# Patient Record
(demographics unavailable — no encounter records)

---

## 2025-02-14 NOTE — HISTORY OF PRESENT ILLNESS
[TextBox_4] : 61 yo M with severe asthma, nasal polyps, HTN, history of 30 pound weight loss, eosinophilia, elevated CEA, and CKD and nephrosclerosis. Former smoker x 22 years (quit in 2005). He was admitted to [facility] via [facility] on 04/15/22 with an "asthma exacerbation" requiring mechanical ventilatory support. He was extubated the following day and treated with prednisone.  He has been undergoing a work up for weight loss and elevated CEA since 2022. Also being evaluated for for eosinophilia - was initially thought to be Churg Deng vs eosinophilic asthma vs. ABPA. 07/31/23, he had a CT chest which revealed new complete atelectasis RML and mild-to-moderate small and large airways inflammation. Underwent bronchoscopy to r/o MARCELINO infection 9/2023 - cultures neg, aspergillosis ag from serum was very mildly elevated, but IgE WNL. Asthma profile WNL. Ultimately underwent both skin and renal biopsy - neither of which demonstrated vasculitis. CKD appears to be 2/2 severe vascular sclerosis for which the focus is on BP control.  12/04/23, he had an echocardiogram for complaint of BANKS which revealed an EF of 63%, moderate TR, and PASP 48 mmHg, referred back to PH/Pulm clinic. At initial visit c/o frequent wheezing and phlegm, at first improved on ICS but then plateaued and symptoms again worsened. Used to have witnessed apneas, resolved after 30lb weight loss. Ultimatel started on Nucala 1/25/24 for significant eosinophilia without IgE, sinus congestion, asthma. At last visit he had received 1 dose and already felt significant improvement in symptoms. Has seen ENT and A/I since last visit, symptoms appear well controlled.   2-14-25 He feels very good. Has no issues with breathing. Nucala has been life changing for him. Still notes post nasal drip and sinus issues that he is following with his ENT. Works as a  and on his feet and lots of physically strenuous work, but no limitations since starting nucala a year ago.

## 2025-02-14 NOTE — ASSESSMENT
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 He is now doing well on Nucala. His CT chest 2/12/24 showed significant improvement in previously demonstrated infectious/inflammatory bronchiolitis and mucous plugging with much less air trapping and resolved atelectasis of RML. His PFTs 2/2025 with obstruction  and mildly reduced DLCO. Nasal congestion and polyps improved w/ steroids, also confirmed by CT sinuses 4/2024 (chronic opacification of frontal-ethmoid-maxillary sinuses unchanged). He has follow up with ENT and allergy. Discussed he should avoid ALL NSAIDS/aspirin, likely has AERD. - Continue Nucala - Repeat PFTs as above, reduced FEV1 and ratio however clinically doing very well. Will continue to monitor symptoms - Repeat PFTs in 1 year  #PH - mild PH on TTE, known HTN, no secondary findings of PAH (normal RV size/function, normal RA size) although TR has increased. Will focus on treatment of airway disease as above and re-evaluate TTE. Symptoms more consistent with airway disease rather than PH. Repeat yearly TTE  w/ cardiology  Plan: -continue Nucala -can use Wixela as needed, new prescription sent -continue nasal spray, nasal irrigation per ENT -continue follow up with ENT/allergy  -will repeat CBC today w diff -avoid NSAIDS  RTC 6 months

## 2025-02-14 NOTE — ASSESSMENT
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 He is now doing well on Nucala. His CT chest 2/12/24 showed significant improvement in previously demonstrated infectious/inflammatory bronchiolitis and mucous plugging with much less air trapping and resolved atelectasis of RML. His PFTs 2/2025 with obstruction  and mildly reduced DLCO. Nasal congestion and polyps improved w/ steroids, also confirmed by CT sinuses 4/2024 (chronic opacification of frontal-ethmoid-maxillary sinuses unchanged). He has follow up with ENT and allergy. Discussed he should avoid ALL NSAIDS/aspirin, likely has AERD. - Continue Nucala - Repeat PFTs as above, reduced FEV1 and ratio however clinically doing very well. Will continue to monitor symptoms - Repeat PFTs in 1 year  #PH - mild PH on TTE, known HTN, no secondary findings of PAH (normal RV size/function, normal RA size) although TR has increased. Will focus on treatment of airway disease as above and re-evaluate TTE. Symptoms more consistent with airway disease rather than PH. Repeat yearly TTE  w/ cardiology  Plan: -continue Nucala -can use Wixela as needed, new prescription sent -continue nasal spray, nasal irrigation per ENT -continue follow up with ENT/allergy  -will repeat CBC today w diff -avoid NSAIDS  RTC 6 months

## 2025-02-14 NOTE — HISTORY OF PRESENT ILLNESS
[TextBox_4] : 61 yo M with severe asthma, nasal polyps, HTN, history of 30 pound weight loss, eosinophilia, elevated CEA, and CKD and nephrosclerosis. Former smoker x 22 years (quit in 2005). He was admitted to [facility] on 04/15/22 with an "asthma exacerbation" requiring mechanical ventilatory support. He was extubated the following day and treated with prednisone.  He has been undergoing a work up for weight loss and elevated CEA since 2022. Also being evaluated for for eosinophilia - was initially thought to be Churg Deng vs eosinophilic asthma vs. ABPA. 07/31/23, he had a CT chest which revealed new complete atelectasis RML and mild-to-moderate small and large airways inflammation. Underwent bronchoscopy to r/o MARCELINO infection 9/2023 - cultures neg, aspergillosis ag from serum was very mildly elevated, but IgE WNL. Asthma profile WNL. Ultimately underwent both skin and renal biopsy - neither of which demonstrated vasculitis. CKD appears to be 2/2 severe vascular sclerosis for which the focus is on BP control.  12/04/23, he had an echocardiogram for complaint of BANKS which revealed an EF of 63%, moderate TR, and PASP 48 mmHg, referred back to PH/Pulm clinic. At initial visit c/o frequent wheezing and phlegm, at first improved on ICS but then plateaued and symptoms again worsened. Used to have witnessed apneas, resolved after 30lb weight loss. Ultimatel started on Nucala 1/25/24 for significant eosinophilia without IgE, sinus congestion, asthma. At last visit he had received 1 dose and already felt significant improvement in symptoms. Has seen ENT and A/I since last visit, symptoms appear well controlled.   2-14-25 He feels very good. Has no issues with breathing. Nucala has been life changing for him. Still notes post nasal drip and sinus issues that he is following with his ENT. Works as a  and on his feet and lots of physically strenuous work, but no limitations since starting nucala a year ago.

## 2025-02-14 NOTE — REASON FOR VISIT
[Follow-Up] : a follow-up visit [Asthma] : asthma [Pulmonary Hypertension] : pulmonary hypertension [Bronchiectasis] : bronchiectasis [Shortness of Breath] : shortness of breath [Spouse] : spouse [TextBox_13] : Dr. Nikolay Conner

## 2025-02-14 NOTE — CONSULT LETTER
[Dear  ___] : Dear  [unfilled], [Consult Letter:] : I had the pleasure of evaluating your patient, [unfilled]. [Please see my note below.] : Please see my note below. [Consult Closing:] : Thank you very much for allowing me to participate in the care of this patient.  If you have any questions, please do not hesitate to contact me. [Sincerely,] : Sincerely, [FreeTextEntry2] : Dr. Nikolay Conner [FreeTextEntry3] : Kylee Sierra DO  Director of Pulmonary Hypertension Department of Pulmonary and Critical Care [facility]

## 2025-02-14 NOTE — PROCEDURE
[FreeTextEntry1] : 2/14/25 FVC 3.86 101% FEV1 1.92 63% FEV1/FVC 50 TLC 93% % DLCO 70%  7/28/24 CEA WNL  CBC 4/22/24 - eos 1.4% (abs count 60) *** CT sinuses 4/15/24 - Improved sinus opacification since 6/19/23 at bilateral sphenoid and right maxillary sites. Right nasal polyp volume improved as well. Persistent chronic opacification of both frontal-ethmoid-maxillary sinuses with outflow obstruction as previously reported. *** 2/14/24 PFT FVC 4.13 (100) FEV1 3.10 (98) FEV1/FVC 75 FEF 25-75% 2.00 (75) TLC 5.69 (90) VC 3.98 (96) RV/TLC 30.17 (82) DLCO 17.45 (67) ***** 2/12/24 CT CHEST Significant interval improvement of previously demonstrated infectious/inflammatory bronchiolitis and mucous plugging with significant improvement in aeration of the lung parenchyma including the previously atelectatic right middle lobe. **** 12/4/23 ECHOCARDIOGRAM 1. Normal left ventricular cavity size, wall thickness, and systolic function, LV E 69%.  2. Normal left ventricular diastolic function, with normal filling pressure.  3. Normal right ventricular cavity size, wall thickness, and systolic function.  4. Normal left and right atrial size.  5. Moderate tricuspid regurgitation.  6. No pericardial effusion seen.  7. Pulmonary hypertension, PASP 48 mmHg.  8. Compared to the transthoracic echocardiogram performed on 12/12/2022 tricuspid regurgitation and pulmonary artery systolic pressure has increased (2022 PASP 22 mmHg). *** 8/16/23 CBC - eos 29.5% (abs 2,220) ***** 7/31/23 CT CHEST with IVC 1. New complete atelectasis of the right middle lobe without identifiable central obstructing lesion. This may be due to transient mucous plugging and inspissated mucus which has increased in extent along the posterior wall of the tracheal air column into the posterior wall of the right mainstem bronchus (4:92-97). Bronchoscopic correlation suggested however.  2. Mild to moderate small and large airways inflammation which has increased in extent with slight interval increase in peribronchial cuffing and mucus plugging as well as scattered tree-in-bud micronodules most pronounced within the lingula and both lower lung zones. Abbreviated differential includes chronic infectious and/or inflammatory etiologies such as pulmonary nontuberculous mycobacterial infection. ***** CT chest (4/22): Bronchiolitis/tree in bud opacities  Labs (4/22): eosinophilia (910)  Labs (5/22): Eosinophilia (370)  PFT (5 /22 ): Normal spirometry with normal lung capacity with normal diffusion capacity.  Asthma profile, IGE (Negative)

## 2025-02-14 NOTE — CONSULT LETTER
[Dear  ___] : Dear  [unfilled], [Consult Letter:] : I had the pleasure of evaluating your patient, [unfilled]. [Please see my note below.] : Please see my note below. [Consult Closing:] : Thank you very much for allowing me to participate in the care of this patient.  If you have any questions, please do not hesitate to contact me. [Sincerely,] : Sincerely, [FreeTextEntry2] : Dr. Nikolay Conner [FreeTextEntry3] : Kylee Sierra DO  Director of Pulmonary Hypertension Department of Pulmonary and Critical Care [facility]